# Patient Record
Sex: FEMALE | Race: WHITE | URBAN - METROPOLITAN AREA
[De-identification: names, ages, dates, MRNs, and addresses within clinical notes are randomized per-mention and may not be internally consistent; named-entity substitution may affect disease eponyms.]

---

## 2020-12-14 ENCOUNTER — OFFICE VISIT (OUTPATIENT)
Dept: URBAN - METROPOLITAN AREA CLINIC 115 | Facility: CLINIC | Age: 50
End: 2020-12-14
Payer: COMMERCIAL

## 2020-12-14 ENCOUNTER — WEB ENCOUNTER (OUTPATIENT)
Dept: URBAN - METROPOLITAN AREA CLINIC 115 | Facility: CLINIC | Age: 50
End: 2020-12-14

## 2020-12-14 DIAGNOSIS — K51.00 ULCERATIVE PANCOLITIS: ICD-10-CM

## 2020-12-14 PROCEDURE — G9903 PT SCRN TBCO ID AS NON USER: HCPCS | Performed by: INTERNAL MEDICINE

## 2020-12-14 PROCEDURE — G8420 CALC BMI NORM PARAMETERS: HCPCS | Performed by: INTERNAL MEDICINE

## 2020-12-14 PROCEDURE — 99213 OFFICE O/P EST LOW 20 MIN: CPT | Performed by: INTERNAL MEDICINE

## 2020-12-14 PROCEDURE — G8483 FLU IMM NO ADMIN DOC REA: HCPCS | Performed by: INTERNAL MEDICINE

## 2020-12-14 PROCEDURE — 3017F COLORECTAL CA SCREEN DOC REV: CPT | Performed by: INTERNAL MEDICINE

## 2020-12-14 PROCEDURE — G8427 DOCREV CUR MEDS BY ELIG CLIN: HCPCS | Performed by: INTERNAL MEDICINE

## 2020-12-14 RX ORDER — FLUTICASONE PROPIONATE 50 UG/1
SPRAY, METERED NASAL
Qty: 0 | Refills: 0 | Status: ACTIVE | COMMUNITY
Start: 1900-01-01

## 2020-12-14 RX ORDER — LEVONORGESTREL AND ETHINYL ESTRADIOL 0.15-0.03
TAKE 1 TABLET BY ORAL ROUTE ONCE DAILY KIT ORAL 1
Qty: 0 | Refills: 0 | Status: ACTIVE | COMMUNITY
Start: 1900-01-01

## 2020-12-14 RX ORDER — MELATONIN 5 MG
CAPSULE ORAL
Qty: 0 | Refills: 0 | Status: ACTIVE | COMMUNITY
Start: 1900-01-01

## 2020-12-14 RX ORDER — ATORVASTATIN CALCIUM 40 MG/1
TABLET, FILM COATED ORAL
Qty: 0 | Refills: 0 | Status: ACTIVE | COMMUNITY
Start: 1900-01-01

## 2020-12-14 RX ORDER — IBUPROFEN 200 MG/1
CAPSULE, LIQUID FILLED ORAL
Qty: 0 | Refills: 0 | Status: ACTIVE | COMMUNITY
Start: 1900-01-01

## 2020-12-14 RX ORDER — PSEUDOEPHEDRINE HYDROCHLORIDE 30 MG/1
TAKE 2 TABLETS (60 MG) BY ORAL ROUTE EVERY 6 HOURS AS NEEDED TABLET, COATED ORAL
Qty: 0 | Refills: 0 | Status: ACTIVE | COMMUNITY
Start: 1900-01-01

## 2020-12-14 RX ORDER — LORATADINE 5 MG/5 ML
TAKE 10 MILLILITERS (10 MG) BY ORAL ROUTE ONCE DAILY SOLUTION, ORAL ORAL 1
Qty: 0 | Refills: 0 | Status: ACTIVE | COMMUNITY
Start: 1900-01-01

## 2020-12-14 RX ORDER — BIFIDOBACTERIUM LONGUM 10MM CELL
CAPSULE ORAL
Qty: 0 | Refills: 0 | Status: ACTIVE | COMMUNITY
Start: 1900-01-01

## 2020-12-14 RX ORDER — EZETIMIBE 10 MG/1
TAKE 1 TABLET (10 MG) BY ORAL ROUTE ONCE DAILY TABLET ORAL 1
Qty: 0 | Refills: 0 | Status: ACTIVE | COMMUNITY
Start: 1900-01-01

## 2020-12-14 NOTE — HPI-TODAY'S VISIT:
Ms. Coffey returns in follow-up of ulcerative colitis she is in remission on Lialda.  She has no active complaints.  She brings labs from her primary care physician from the end of July that are all normal.  Last colonoscopy February 4, 2020, 1 single patchy area of eroded mucosa in the ascending colon.  Remainder of colon was normal.  Biopsies every 10 cm were negative for dysplasia. Ms. Coffey returns in follow-up of ulcerative colitis she is in remission on Lialda.  She has no active complaints.  She brings labs from her primary care physician from the end of July that are all normal.  Last colonoscopy February 4, 2020, 1 single patchy area of eroded mucosa in the ascending colon.  Remainder of colon was normal.  Biopsies every 10 cm were negative for dysplasia.

## 2021-01-25 ENCOUNTER — TELEPHONE ENCOUNTER (OUTPATIENT)
Dept: URBAN - METROPOLITAN AREA CLINIC 82 | Facility: CLINIC | Age: 51
End: 2021-01-25

## 2021-01-25 ENCOUNTER — ERX REFILL RESPONSE (OUTPATIENT)
Dept: URBAN - METROPOLITAN AREA CLINIC 13 | Facility: CLINIC | Age: 51
End: 2021-01-25

## 2021-01-25 RX ORDER — MESALAMINE 1.2 G/1
TAKE 2 TABLETS BY MOUTH EVERY DAY TABLET, DELAYED RELEASE ORAL
Qty: 180 | Refills: 3

## 2022-01-10 ENCOUNTER — WEB ENCOUNTER (OUTPATIENT)
Dept: URBAN - METROPOLITAN AREA CLINIC 115 | Facility: CLINIC | Age: 52
End: 2022-01-10

## 2022-01-10 ENCOUNTER — OFFICE VISIT (OUTPATIENT)
Dept: URBAN - METROPOLITAN AREA CLINIC 115 | Facility: CLINIC | Age: 52
End: 2022-01-10
Payer: COMMERCIAL

## 2022-01-10 DIAGNOSIS — K51.00 ULCERATIVE PANCOLITIS: ICD-10-CM

## 2022-01-10 PROCEDURE — 99213 OFFICE O/P EST LOW 20 MIN: CPT | Performed by: INTERNAL MEDICINE

## 2022-01-10 RX ORDER — ATORVASTATIN CALCIUM 40 MG/1
TABLET, FILM COATED ORAL
Qty: 0 | Refills: 0 | Status: ACTIVE | COMMUNITY
Start: 1900-01-01

## 2022-01-10 RX ORDER — LORATADINE 5 MG/5 ML
TAKE 10 MILLILITERS (10 MG) BY ORAL ROUTE ONCE DAILY SOLUTION, ORAL ORAL 1
Qty: 0 | Refills: 0 | Status: ACTIVE | COMMUNITY
Start: 1900-01-01

## 2022-01-10 RX ORDER — PSEUDOEPHEDRINE HYDROCHLORIDE 30 MG/1
TAKE 2 TABLETS (60 MG) BY ORAL ROUTE EVERY 6 HOURS AS NEEDED TABLET, COATED ORAL
Qty: 0 | Refills: 0 | Status: ACTIVE | COMMUNITY
Start: 1900-01-01

## 2022-01-10 RX ORDER — BIFIDOBACTERIUM LONGUM 10MM CELL
CAPSULE ORAL
Qty: 0 | Refills: 0 | Status: ACTIVE | COMMUNITY
Start: 1900-01-01

## 2022-01-10 RX ORDER — MESALAMINE 1.2 G/1
TAKE 2 TABLETS BY MOUTH EVERY DAY TABLET, DELAYED RELEASE ORAL
Qty: 180 | Refills: 3 | Status: ACTIVE | COMMUNITY

## 2022-01-10 RX ORDER — LEVONORGESTREL AND ETHINYL ESTRADIOL 0.15-0.03
1 TABLET KIT ORAL ONCE A DAY
Status: ACTIVE | COMMUNITY

## 2022-01-10 RX ORDER — LEVONORGESTREL AND ETHINYL ESTRADIOL 0.15-0.03
TAKE 1 TABLET BY ORAL ROUTE ONCE DAILY KIT ORAL 1
Qty: 0 | Refills: 0 | COMMUNITY
Start: 1900-01-01

## 2022-01-10 RX ORDER — MESALAMINE 1.2 G/1
2 TABLETS WITH A MEAL TABLET, DELAYED RELEASE ORAL ONCE A DAY
Qty: 180 TABLET | Refills: 3 | OUTPATIENT
Start: 2022-01-10 | End: 2023-01-05

## 2022-01-10 RX ORDER — FLUTICASONE PROPIONATE 50 UG/1
SPRAY, METERED NASAL
Qty: 0 | Refills: 0 | Status: ACTIVE | COMMUNITY
Start: 1900-01-01

## 2022-01-10 RX ORDER — MELATONIN 5 MG
CAPSULE ORAL
Qty: 0 | Refills: 0 | Status: ACTIVE | COMMUNITY
Start: 1900-01-01

## 2022-01-10 RX ORDER — EZETIMIBE 10 MG/1
TAKE 1 TABLET (10 MG) BY ORAL ROUTE ONCE DAILY TABLET ORAL 1
Qty: 0 | Refills: 0 | Status: ACTIVE | COMMUNITY
Start: 1900-01-01

## 2022-01-10 RX ORDER — IBUPROFEN 200 MG/1
CAPSULE, LIQUID FILLED ORAL
Qty: 0 | Refills: 0 | Status: ACTIVE | COMMUNITY
Start: 1900-01-01

## 2022-01-10 NOTE — HPI-TODAY'S VISIT:
Ms. Coffey returns in follow-up of ulcerative colitis she is in remission on Lialda.  She has no active complaints.  I last saw her in office 12/2020. Last colonoscopy February 4, 2020, 1 single patchy area of eroded mucosa in the ascending colon.  Remainder of colon was normal.  Biopsies every 10 cm were negative for dysplasia.  Averages 1 flare per year.  Last was December 2020, prior to that 01/2020.  With flares symptoms are diarrhea and nausea.    Things back to normal now.  Due to change in insurance wants to hold off on her surveilance colonoscopy for 6 to 12 months.

## 2022-01-11 ENCOUNTER — TELEPHONE ENCOUNTER (OUTPATIENT)
Dept: URBAN - METROPOLITAN AREA CLINIC 82 | Facility: CLINIC | Age: 52
End: 2022-01-11

## 2022-01-11 ENCOUNTER — WEB ENCOUNTER (OUTPATIENT)
Dept: URBAN - METROPOLITAN AREA CLINIC 115 | Facility: CLINIC | Age: 52
End: 2022-01-11

## 2022-01-11 LAB
A/G RATIO: 2.5
ALBUMIN: 4.8
ALKALINE PHOSPHATASE: 42
ALT (SGPT): 18
AST (SGOT): 20
BILIRUBIN, TOTAL: 0.3
BUN/CREATININE RATIO: 22
BUN: 19
C-REACTIVE PROTEIN, QUANT: <1
CALCIUM: 10
CARBON DIOXIDE, TOTAL: 21
CHLORIDE: 100
CREATININE: 0.85
EGFR IF AFRICN AM: 92
EGFR IF NONAFRICN AM: 80
GLOBULIN, TOTAL: 1.9
GLUCOSE: 82
HEMATOCRIT: 40.3
HEMOGLOBIN: 13.5
MCH: 32.1
MCHC: 33.5
MCV: 96
NRBC: (no result)
PLATELETS: 295
POTASSIUM: 4.1
PROTEIN, TOTAL: 6.7
RBC: 4.21
RDW: 12.1
SODIUM: 138
WBC: 8.1

## 2023-01-09 ENCOUNTER — OFFICE VISIT (OUTPATIENT)
Dept: URBAN - METROPOLITAN AREA CLINIC 115 | Facility: CLINIC | Age: 53
End: 2023-01-09
Payer: COMMERCIAL

## 2023-01-09 ENCOUNTER — LAB OUTSIDE AN ENCOUNTER (OUTPATIENT)
Dept: URBAN - METROPOLITAN AREA CLINIC 115 | Facility: CLINIC | Age: 53
End: 2023-01-09

## 2023-01-09 VITALS
TEMPERATURE: 97.8 F | BODY MASS INDEX: 23 KG/M2 | HEART RATE: 60 BPM | DIASTOLIC BLOOD PRESSURE: 78 MMHG | SYSTOLIC BLOOD PRESSURE: 132 MMHG | HEIGHT: 63 IN | WEIGHT: 129.8 LBS

## 2023-01-09 DIAGNOSIS — K51.00 ULCERATIVE PANCOLITIS: ICD-10-CM

## 2023-01-09 PROCEDURE — 99213 OFFICE O/P EST LOW 20 MIN: CPT | Performed by: INTERNAL MEDICINE

## 2023-01-09 RX ORDER — MESALAMINE 1.2 G/1
TAKE 2 TABLETS BY MOUTH EVERY DAY TABLET, DELAYED RELEASE ORAL
Qty: 180 | Refills: 3 | Status: ACTIVE | COMMUNITY

## 2023-01-09 RX ORDER — OCTISALATE, AVOBENZONE, HOMOSALATE, AND OCTOCRYLENE 29.4; 29.4; 49; 25.48 MG/ML; MG/ML; MG/ML; MG/ML
AS DIRECTED LOTION TOPICAL
Status: ACTIVE | COMMUNITY

## 2023-01-09 RX ORDER — IBUPROFEN 200 MG/1
CAPSULE, LIQUID FILLED ORAL
Qty: 0 | Refills: 0 | Status: ON HOLD | COMMUNITY
Start: 1900-01-01

## 2023-01-09 RX ORDER — FLUTICASONE PROPIONATE 50 UG/1
SPRAY, METERED NASAL
Qty: 0 | Refills: 0 | Status: ACTIVE | COMMUNITY
Start: 1900-01-01

## 2023-01-09 RX ORDER — PSEUDOEPHEDRINE HYDROCHLORIDE 30 MG/1
TAKE 2 TABLETS (60 MG) BY ORAL ROUTE EVERY 6 HOURS AS NEEDED TABLET, COATED ORAL
Qty: 0 | Refills: 0 | Status: ACTIVE | COMMUNITY
Start: 1900-01-01

## 2023-01-09 RX ORDER — LEVONORGESTREL AND ETHINYL ESTRADIOL 0.15-0.03
TAKE 1 TABLET BY ORAL ROUTE ONCE DAILY KIT ORAL 1
Qty: 0 | Refills: 0 | COMMUNITY
Start: 1900-01-01

## 2023-01-09 RX ORDER — EZETIMIBE 10 MG/1
TAKE 1 TABLET (10 MG) BY ORAL ROUTE ONCE DAILY TABLET ORAL 1
Qty: 0 | Refills: 0 | Status: ON HOLD | COMMUNITY
Start: 1900-01-01

## 2023-01-09 RX ORDER — LEVONORGESTREL AND ETHINYL ESTRADIOL 0.15-0.03
1 TABLET KIT ORAL ONCE A DAY
Status: ACTIVE | COMMUNITY

## 2023-01-09 RX ORDER — MELATONIN 5 MG
CAPSULE ORAL
Qty: 0 | Refills: 0 | Status: ON HOLD | COMMUNITY
Start: 1900-01-01

## 2023-01-09 RX ORDER — BIFIDOBACTERIUM LONGUM 10MM CELL
CAPSULE ORAL
Qty: 0 | Refills: 0 | Status: ACTIVE | COMMUNITY
Start: 1900-01-01

## 2023-01-09 RX ORDER — EZETIMIBE 10 MG/1
1 TABLET TABLET ORAL ONCE A DAY
Status: ACTIVE | COMMUNITY

## 2023-01-09 RX ORDER — OXYCODONE HYDROCHLORIDE AND ACETAMINOPHEN 500 MG
1 TABLET TABLET ORAL ONCE A DAY
Status: ACTIVE | COMMUNITY

## 2023-01-09 RX ORDER — LORATADINE 10 MG/1
1 TABLET TABLET ORAL ONCE A DAY
Status: ACTIVE | COMMUNITY

## 2023-01-09 RX ORDER — FLUTICASONE PROPIONATE 50 UG/1
1 SPRAY IN EACH NOSTRIL SPRAY, METERED NASAL ONCE A DAY
Status: ACTIVE | COMMUNITY

## 2023-01-09 RX ORDER — ATORVASTATIN CALCIUM 40 MG/1
TABLET, FILM COATED ORAL
Qty: 0 | Refills: 0 | Status: ACTIVE | COMMUNITY
Start: 1900-01-01

## 2023-01-09 RX ORDER — LORATADINE 5 MG/5 ML
TAKE 10 MILLILITERS (10 MG) BY ORAL ROUTE ONCE DAILY SOLUTION, ORAL ORAL 1
Qty: 0 | Refills: 0 | Status: ACTIVE | COMMUNITY
Start: 1900-01-01

## 2023-01-09 NOTE — HPI-TODAY'S VISIT:
51 y/o white female presents for follow up of ulcerative colitis. In remission on Lialda.  She has no active complaints.   Last surveillance colonoscopy February 4, 2020, 1 single patchy area of eroded mucosa in the ascending colon.  Remainder of colon was normal.  Biopsies every 10 cm were negative for dysplasia.  Overdue for surveillance however she defered last year due to insurance changes.  Last labs by PCP 8/10/22.  Normal CBC and CMP.  Had alot of stress after father's death last year, did not result in a flare.

## 2023-01-10 ENCOUNTER — TELEPHONE ENCOUNTER (OUTPATIENT)
Dept: URBAN - METROPOLITAN AREA CLINIC 115 | Facility: CLINIC | Age: 53
End: 2023-01-10

## 2023-01-10 ENCOUNTER — TELEPHONE ENCOUNTER (OUTPATIENT)
Dept: URBAN - METROPOLITAN AREA CLINIC 82 | Facility: CLINIC | Age: 53
End: 2023-01-10

## 2023-01-10 LAB
A/G RATIO: 2.4
ALBUMIN: 4.6
ALKALINE PHOSPHATASE: 45
ALT (SGPT): 30
AST (SGOT): 32
BILIRUBIN, TOTAL: 0.5
BUN/CREATININE RATIO: (no result)
BUN: 22
C-REACTIVE PROTEIN, QUANT: <0.2
CALCIUM: 10
CARBON DIOXIDE, TOTAL: 28
CHLORIDE: 105
CREATININE: 0.74
EGFR: 97
GLOBULIN, TOTAL: 1.9
GLUCOSE: 63
HEMATOCRIT: 40
HEMOGLOBIN: 13.2
MCH: 32.2
MCHC: 33
MCV: 97.6
MPV: 10.1
PLATELET COUNT: 205
POTASSIUM: 5.1
PROTEIN, TOTAL: 6.5
RDW: 12
RED BLOOD CELL COUNT: 4.1
SODIUM: 141
WHITE BLOOD CELL COUNT: 6.3

## 2023-01-13 ENCOUNTER — ERX REFILL RESPONSE (OUTPATIENT)
Dept: URBAN - METROPOLITAN AREA CLINIC 115 | Facility: CLINIC | Age: 53
End: 2023-01-13

## 2023-01-13 RX ORDER — MESALAMINE 1.2 G/1
2 TABLETS WITH A MEAL ORALLY ONCE A DAY 90 DAYS TABLET, DELAYED RELEASE ORAL
Qty: 180 TABLET | Refills: 3 | OUTPATIENT

## 2023-01-13 RX ORDER — MESALAMINE 1.2 G/1
TAKE 2 TABLETS BY MOUTH EVERY DAY TABLET, DELAYED RELEASE ORAL
Qty: 180 | Refills: 3 | OUTPATIENT

## 2023-01-18 PROBLEM — 444548001: Status: ACTIVE | Noted: 2020-12-15

## 2023-02-14 ENCOUNTER — OFFICE VISIT (OUTPATIENT)
Dept: URBAN - METROPOLITAN AREA SURGERY CENTER 13 | Facility: SURGERY CENTER | Age: 53
End: 2023-02-14
Payer: COMMERCIAL

## 2023-02-14 ENCOUNTER — CLAIMS CREATED FROM THE CLAIM WINDOW (OUTPATIENT)
Dept: URBAN - METROPOLITAN AREA CLINIC 4 | Facility: CLINIC | Age: 53
End: 2023-02-14
Payer: COMMERCIAL

## 2023-02-14 DIAGNOSIS — K63.89 APPENDICITIS EPIPLOICA: ICD-10-CM

## 2023-02-14 DIAGNOSIS — K51.00 ACUTE ULCERATIVE PANCOLITIS: ICD-10-CM

## 2023-02-14 DIAGNOSIS — K63.89 OTHER SPECIFIED DISEASES OF INTESTINE: ICD-10-CM

## 2023-02-14 DIAGNOSIS — K52.89 (LYMPHOCYTIC) MICROSCOPIC COLITIS: ICD-10-CM

## 2023-02-14 PROCEDURE — 88305 TISSUE EXAM BY PATHOLOGIST: CPT | Performed by: PATHOLOGY

## 2023-02-14 PROCEDURE — G8907 PT DOC NO EVENTS ON DISCHARG: HCPCS | Performed by: INTERNAL MEDICINE

## 2023-02-14 PROCEDURE — 45380 COLONOSCOPY AND BIOPSY: CPT | Performed by: INTERNAL MEDICINE

## 2023-02-21 ENCOUNTER — WEB ENCOUNTER (OUTPATIENT)
Dept: URBAN - METROPOLITAN AREA CLINIC 115 | Facility: CLINIC | Age: 53
End: 2023-02-21

## 2024-01-22 ENCOUNTER — OFFICE VISIT (OUTPATIENT)
Dept: URBAN - METROPOLITAN AREA CLINIC 115 | Facility: CLINIC | Age: 54
End: 2024-01-22
Payer: COMMERCIAL

## 2024-01-22 ENCOUNTER — DASHBOARD ENCOUNTERS (OUTPATIENT)
Age: 54
End: 2024-01-22

## 2024-01-22 VITALS
BODY MASS INDEX: 25.16 KG/M2 | DIASTOLIC BLOOD PRESSURE: 81 MMHG | WEIGHT: 142 LBS | SYSTOLIC BLOOD PRESSURE: 120 MMHG | HEART RATE: 63 BPM | TEMPERATURE: 98.4 F | HEIGHT: 63 IN

## 2024-01-22 DIAGNOSIS — K51.00 ULCERATIVE PANCOLITIS: ICD-10-CM

## 2024-01-22 PROCEDURE — 99213 OFFICE O/P EST LOW 20 MIN: CPT | Performed by: INTERNAL MEDICINE

## 2024-01-22 RX ORDER — FLUTICASONE PROPIONATE 50 UG/1
SPRAY, METERED NASAL
Qty: 0 | Refills: 0 | Status: ACTIVE | COMMUNITY
Start: 1900-01-01

## 2024-01-22 RX ORDER — LEVONORGESTREL AND ETHINYL ESTRADIOL 0.15-0.03
1 TABLET KIT ORAL ONCE A DAY
Status: ACTIVE | COMMUNITY

## 2024-01-22 RX ORDER — MAGNESIUM GLYCINATE 100 MG
AS DIRECTED TABLET ORAL
Status: ACTIVE | COMMUNITY

## 2024-01-22 RX ORDER — BIFIDOBACTERIUM LONGUM 10MM CELL
CAPSULE ORAL
Qty: 0 | Refills: 0 | Status: ACTIVE | COMMUNITY
Start: 1900-01-01

## 2024-01-22 RX ORDER — IBUPROFEN 200 MG/1
CAPSULE, LIQUID FILLED ORAL
Qty: 0 | Refills: 0 | Status: ON HOLD | COMMUNITY
Start: 1900-01-01

## 2024-01-22 RX ORDER — OXYCODONE HYDROCHLORIDE AND ACETAMINOPHEN 500 MG
1 TABLET TABLET ORAL ONCE A DAY
Status: ACTIVE | COMMUNITY

## 2024-01-22 RX ORDER — LORATADINE 5 MG/5 ML
TAKE 10 MILLILITERS (10 MG) BY ORAL ROUTE ONCE DAILY SOLUTION, ORAL ORAL 1
Qty: 0 | Refills: 0 | Status: ACTIVE | COMMUNITY
Start: 1900-01-01

## 2024-01-22 RX ORDER — ATORVASTATIN CALCIUM 20 MG/1
1 TABLET TABLET, FILM COATED ORAL ONCE A DAY
Status: ACTIVE | COMMUNITY

## 2024-01-22 RX ORDER — EZETIMIBE 10 MG/1
1 TABLET TABLET ORAL ONCE A DAY
Status: ACTIVE | COMMUNITY

## 2024-01-22 RX ORDER — LORATADINE 10 MG/1
1 TABLET TABLET ORAL ONCE A DAY
Status: ACTIVE | COMMUNITY

## 2024-01-22 RX ORDER — EZETIMIBE 10 MG/1
TAKE 1 TABLET (10 MG) BY ORAL ROUTE ONCE DAILY TABLET ORAL 1
Qty: 0 | Refills: 0 | Status: ON HOLD | COMMUNITY
Start: 1900-01-01

## 2024-01-22 RX ORDER — ATORVASTATIN CALCIUM 40 MG/1
TABLET, FILM COATED ORAL
Qty: 0 | Refills: 0 | Status: DISCONTINUED | COMMUNITY
Start: 1900-01-01

## 2024-01-22 RX ORDER — MESALAMINE 1.2 G/1
2 TABLETS WITH A MEAL ORALLY ONCE A DAY 90 DAYS TABLET, DELAYED RELEASE ORAL
Qty: 180 TABLET | Refills: 3 | Status: ACTIVE | COMMUNITY

## 2024-01-22 RX ORDER — MELATONIN 5 MG
CAPSULE ORAL
Qty: 0 | Refills: 0 | Status: ON HOLD | COMMUNITY
Start: 1900-01-01

## 2024-01-22 RX ORDER — MESALAMINE 1.2 G/1
2 TABLETS WITH A MEAL ORALLY ONCE A DAY 90 DAYS TABLET, DELAYED RELEASE ORAL ONCE A DAY
Qty: 180 | Refills: 3

## 2024-01-22 RX ORDER — LEVONORGESTREL AND ETHINYL ESTRADIOL 0.15-0.03
TAKE 1 TABLET BY ORAL ROUTE ONCE DAILY KIT ORAL 1
Qty: 0 | Refills: 0 | COMMUNITY
Start: 1900-01-01

## 2024-01-22 RX ORDER — PSEUDOEPHEDRINE HYDROCHLORIDE 30 MG/1
TAKE 2 TABLETS (60 MG) BY ORAL ROUTE EVERY 6 HOURS AS NEEDED TABLET, COATED ORAL
Qty: 0 | Refills: 0 | Status: ACTIVE | COMMUNITY
Start: 1900-01-01

## 2024-01-22 RX ORDER — OCTISALATE, AVOBENZONE, HOMOSALATE, AND OCTOCRYLENE 29.4; 29.4; 49; 25.48 MG/ML; MG/ML; MG/ML; MG/ML
AS DIRECTED LOTION TOPICAL
Status: ACTIVE | COMMUNITY

## 2024-01-22 RX ORDER — FLUTICASONE PROPIONATE 50 UG/1
1 SPRAY IN EACH NOSTRIL SPRAY, METERED NASAL ONCE A DAY
Status: ACTIVE | COMMUNITY

## 2024-01-22 NOTE — HPI-TODAY'S VISIT:
54 y/o white female presents for follow up of ulcerative colitis. In remission on Lialda.  She has no active complaints.   Last surveillance colonoscopy February 2023, no visible active disease, bxp neg for dysplasia, one w/focal inflammation.  Today she complains of symptoms related to menopause and pelvic floor prolapse.  Will see her GYN next month.

## 2024-01-24 LAB
A/G RATIO: 2.6
ALBUMIN: 4.6
ALKALINE PHOSPHATASE: 41
ALT (SGPT): 22
AST (SGOT): 23
BILIRUBIN, TOTAL: 0.5
BUN/CREATININE RATIO: (no result)
BUN: 20
C-REACTIVE PROTEIN, QUANT: 0.6
CALCIUM: 9.8
CARBON DIOXIDE, TOTAL: 28
CHLORIDE: 103
CREATININE: 0.73
EGFR: 98
GLOBULIN, TOTAL: 1.8
GLUCOSE: 75
HEMATOCRIT: 41
HEMOGLOBIN: 13.7
MCH: 32.3
MCHC: 33.4
MCV: 96.7
MPV: 9.6
PLATELET COUNT: 231
POTASSIUM: 5
PROTEIN, TOTAL: 6.4
RDW: 12
RED BLOOD CELL COUNT: 4.24
SODIUM: 142
WHITE BLOOD CELL COUNT: 6.3

## 2024-07-22 ENCOUNTER — OFFICE VISIT (OUTPATIENT)
Dept: URBAN - METROPOLITAN AREA CLINIC 115 | Facility: CLINIC | Age: 54
End: 2024-07-22

## 2024-11-06 ENCOUNTER — OFFICE VISIT (OUTPATIENT)
Dept: URBAN - METROPOLITAN AREA CLINIC 82 | Facility: CLINIC | Age: 54
End: 2024-11-06
Payer: COMMERCIAL

## 2024-11-06 VITALS
WEIGHT: 143.8 LBS | BODY MASS INDEX: 25.48 KG/M2 | TEMPERATURE: 97.7 F | SYSTOLIC BLOOD PRESSURE: 131 MMHG | DIASTOLIC BLOOD PRESSURE: 76 MMHG | HEIGHT: 63 IN | HEART RATE: 72 BPM

## 2024-11-06 DIAGNOSIS — K64.4 EXTERNAL HEMORRHOID: ICD-10-CM

## 2024-11-06 DIAGNOSIS — K59.4 ANORECTAL SPASM: ICD-10-CM

## 2024-11-06 PROBLEM — 23913003: Status: ACTIVE | Noted: 2024-11-06

## 2024-11-06 PROCEDURE — 99214 OFFICE O/P EST MOD 30 MIN: CPT | Performed by: INTERNAL MEDICINE

## 2024-11-06 RX ORDER — FLUTICASONE PROPIONATE 50 UG/1
1 SPRAY IN EACH NOSTRIL SPRAY, METERED NASAL ONCE A DAY
Status: ACTIVE | COMMUNITY

## 2024-11-06 RX ORDER — LORATADINE 5 MG/5 ML
TAKE 10 MILLILITERS (10 MG) BY ORAL ROUTE ONCE DAILY SOLUTION, ORAL ORAL 1
Qty: 0 | Refills: 0 | Status: ACTIVE | COMMUNITY
Start: 1900-01-01

## 2024-11-06 RX ORDER — FLUTICASONE PROPIONATE 50 UG/1
SPRAY, METERED NASAL
Qty: 0 | Refills: 0 | Status: ACTIVE | COMMUNITY
Start: 1900-01-01

## 2024-11-06 RX ORDER — BIFIDOBACTERIUM LONGUM 10MM CELL
CAPSULE ORAL
Qty: 0 | Refills: 0 | Status: ACTIVE | COMMUNITY
Start: 1900-01-01

## 2024-11-06 RX ORDER — LORATADINE 10 MG/1
1 TABLET TABLET ORAL ONCE A DAY
Status: ACTIVE | COMMUNITY

## 2024-11-06 RX ORDER — ATORVASTATIN CALCIUM 20 MG/1
1 TABLET TABLET, FILM COATED ORAL ONCE A DAY
Status: DISCONTINUED | COMMUNITY

## 2024-11-06 RX ORDER — PSEUDOEPHEDRINE HYDROCHLORIDE 30 MG/1
TAKE 2 TABLETS (60 MG) BY ORAL ROUTE EVERY 6 HOURS AS NEEDED TABLET, COATED ORAL
Qty: 0 | Refills: 0 | Status: ACTIVE | COMMUNITY
Start: 1900-01-01

## 2024-11-06 RX ORDER — IBUPROFEN 200 MG/1
CAPSULE, LIQUID FILLED ORAL
Qty: 0 | Refills: 0 | Status: ON HOLD | COMMUNITY
Start: 1900-01-01

## 2024-11-06 RX ORDER — OXYCODONE HYDROCHLORIDE AND ACETAMINOPHEN 500 MG
1 TABLET TABLET ORAL ONCE A DAY
Status: ACTIVE | COMMUNITY

## 2024-11-06 RX ORDER — OCTISALATE, AVOBENZONE, HOMOSALATE, AND OCTOCRYLENE 29.4; 29.4; 49; 25.48 MG/ML; MG/ML; MG/ML; MG/ML
AS DIRECTED LOTION TOPICAL
Status: ACTIVE | COMMUNITY

## 2024-11-06 RX ORDER — MELATONIN 5 MG
CAPSULE ORAL
Qty: 0 | Refills: 0 | Status: ON HOLD | COMMUNITY
Start: 1900-01-01

## 2024-11-06 RX ORDER — LEVONORGESTREL AND ETHINYL ESTRADIOL 0.15-0.03
TAKE 1 TABLET BY ORAL ROUTE ONCE DAILY KIT ORAL 1
Qty: 0 | Refills: 0 | COMMUNITY
Start: 1900-01-01

## 2024-11-06 RX ORDER — LEVONORGESTREL AND ETHINYL ESTRADIOL 0.15-0.03
1 TABLET KIT ORAL ONCE A DAY
Status: ACTIVE | COMMUNITY

## 2024-11-06 RX ORDER — MAGNESIUM GLYCINATE 100 MG
AS DIRECTED TABLET ORAL
Status: ACTIVE | COMMUNITY

## 2024-11-06 RX ORDER — EZETIMIBE 10 MG/1
TAKE 1 TABLET (10 MG) BY ORAL ROUTE ONCE DAILY TABLET ORAL 1
Qty: 0 | Refills: 0 | Status: ON HOLD | COMMUNITY
Start: 1900-01-01

## 2024-11-06 RX ORDER — EZETIMIBE 10 MG/1
1 TABLET TABLET ORAL ONCE A DAY
Status: DISCONTINUED | COMMUNITY

## 2024-11-06 RX ORDER — MESALAMINE 1.2 G/1
2 TABLETS WITH A MEAL ORALLY ONCE A DAY 90 DAYS TABLET, DELAYED RELEASE ORAL
Qty: 180 TABLET | Refills: 3 | Status: DISCONTINUED | COMMUNITY

## 2024-11-06 NOTE — HPI-TODAY'S VISIT:
55 y/o white female with UC, in remission on Lialda, presents for severe rectal pain. Pain started 1 month ago.  Alot of anorectal spasm, also developed hemorrhoids. Using OTC hemorrhoid creams, not improving.  Feels nodule.

## 2024-11-06 NOTE — PHYSICAL EXAM GASTROINTESTINAL
Abdomen,  soft, nontender, nondistended,  normal bowel sounds  Rectum:  hemorrhoid in right anterior position, swollen but soft and mobile, tender.  Skin tag circumferentially. Moderate spasm on digital exam. No bleeding.  Anoscopy:  hemorrhoid present.  No masses.  No bleeding.

## 2024-12-11 ENCOUNTER — OFFICE VISIT (OUTPATIENT)
Dept: URBAN - METROPOLITAN AREA CLINIC 82 | Facility: CLINIC | Age: 54
End: 2024-12-11

## 2025-01-20 ENCOUNTER — OFFICE VISIT (OUTPATIENT)
Dept: URBAN - METROPOLITAN AREA CLINIC 115 | Facility: CLINIC | Age: 55
End: 2025-01-20

## 2025-02-10 ENCOUNTER — OFFICE VISIT (OUTPATIENT)
Dept: URBAN - METROPOLITAN AREA CLINIC 115 | Facility: CLINIC | Age: 55
End: 2025-02-10
Payer: COMMERCIAL

## 2025-02-10 VITALS
WEIGHT: 144.4 LBS | SYSTOLIC BLOOD PRESSURE: 123 MMHG | BODY MASS INDEX: 25.59 KG/M2 | HEART RATE: 87 BPM | HEIGHT: 63 IN | TEMPERATURE: 98 F | DIASTOLIC BLOOD PRESSURE: 82 MMHG

## 2025-02-10 DIAGNOSIS — L29.0 PERIANAL PRURITUS: ICD-10-CM

## 2025-02-10 DIAGNOSIS — K51.00 ULCERATIVE PANCOLITIS: ICD-10-CM

## 2025-02-10 PROCEDURE — 99213 OFFICE O/P EST LOW 20 MIN: CPT | Performed by: INTERNAL MEDICINE

## 2025-02-10 RX ORDER — OXYCODONE HYDROCHLORIDE AND ACETAMINOPHEN 500 MG
1 TABLET TABLET ORAL ONCE A DAY
Status: ACTIVE | COMMUNITY

## 2025-02-10 RX ORDER — OCTISALATE, AVOBENZONE, HOMOSALATE, AND OCTOCRYLENE 29.4; 29.4; 49; 25.48 MG/ML; MG/ML; MG/ML; MG/ML
AS DIRECTED LOTION TOPICAL
Status: ACTIVE | COMMUNITY

## 2025-02-10 RX ORDER — LEVONORGESTREL AND ETHINYL ESTRADIOL 0.15-0.03
TAKE 1 TABLET BY ORAL ROUTE ONCE DAILY KIT ORAL 1
Qty: 0 | Refills: 0 | Status: ACTIVE | COMMUNITY
Start: 1900-01-01

## 2025-02-10 RX ORDER — LORATADINE 5 MG/5 ML
TAKE 10 MILLILITERS (10 MG) BY ORAL ROUTE ONCE DAILY SOLUTION, ORAL ORAL 1
Qty: 0 | Refills: 0 | Status: ACTIVE | COMMUNITY
Start: 1900-01-01

## 2025-02-10 RX ORDER — FLUTICASONE PROPIONATE 50 UG/1
SPRAY, METERED NASAL
Qty: 0 | Refills: 0 | Status: ACTIVE | COMMUNITY
Start: 1900-01-01

## 2025-02-10 RX ORDER — NAPROXEN SODIUM 220 MG
CAPSULE ORAL
Qty: 0 | Refills: 0 | Status: ACTIVE | COMMUNITY
Start: 1900-01-01

## 2025-02-10 RX ORDER — BIFIDOBACTERIUM LONGUM 10MM CELL
CAPSULE ORAL
Qty: 0 | Refills: 0 | Status: ACTIVE | COMMUNITY
Start: 1900-01-01

## 2025-02-10 RX ORDER — FLUTICASONE PROPIONATE 50 UG/1
1 SPRAY IN EACH NOSTRIL SPRAY, METERED NASAL ONCE A DAY
Status: ACTIVE | COMMUNITY

## 2025-02-10 RX ORDER — MAGNESIUM GLYCINATE 100 MG
AS DIRECTED TABLET ORAL
Status: ACTIVE | COMMUNITY

## 2025-02-10 RX ORDER — CLOTRIMAZOLE 1 G/100G
1 APPLICATION CREAM TOPICAL TWICE A DAY
Qty: 30 GRAMS | Refills: 0 | OUTPATIENT
Start: 2025-02-10 | End: 2025-02-24

## 2025-02-10 RX ORDER — MESALAMINE 1.2 G/1
2 TABLETS WITH A MEAL ORALLY ONCE A DAY 90 DAYS TABLET, DELAYED RELEASE ORAL ONCE A DAY
Qty: 180 | Refills: 3

## 2025-02-10 RX ORDER — PSEUDOEPHEDRINE HYDROCHLORIDE 30 MG/1
TAKE 2 TABLETS (60 MG) BY ORAL ROUTE EVERY 6 HOURS AS NEEDED TABLET, COATED ORAL
Qty: 0 | Refills: 0 | Status: ACTIVE | COMMUNITY
Start: 1900-01-01

## 2025-02-10 NOTE — HPI-TODAY'S VISIT:
53 y/o white female with UC presents in follow up. Last seen 12/2024 for rectal pain.  Had anorectal spasm and hemorrhoid. Spasm and pain resolved.  Now having perianal itching, severe at times.   injured during ski trip, resulted in need to travel home by train. Trip prolonged so out ot [place].  No flare.

## 2025-02-10 NOTE — PHYSICAL EXAM GASTROINTESTINAL
Abdomen,  soft, nontender, nondistended,  normal bowel sounds  Rectum: Skin tag circumferentially.  No thrombosed or swollen hemorrhoids.  Perianal erythema w/satellite lesions.

## 2025-02-11 LAB
A/G RATIO: 2.3
ABSOLUTE BASOPHILS: 61
ABSOLUTE EOSINOPHILS: 41
ABSOLUTE LYMPHOCYTES: 2842
ABSOLUTE MONOCYTES: 401
ABSOLUTE NEUTROPHILS: 3454
ALBUMIN: 4.5
ALKALINE PHOSPHATASE: 46
ALT (SGPT): 18
AST (SGOT): 22
BASOPHILS: 0.9
BILIRUBIN, TOTAL: 0.6
BUN/CREATININE RATIO: (no result)
BUN: 17
CALCIUM: 9.7
CARBON DIOXIDE, TOTAL: 28
CHLORIDE: 104
CREATININE: 0.72
EGFR: 99
EOSINOPHILS: 0.6
GLOBULIN, TOTAL: 2
GLUCOSE: 83
HEMATOCRIT: 39.6
HEMOGLOBIN: 13.4
LYMPHOCYTES: 41.8
MCH: 31.9
MCHC: 33.8
MCV: 94.3
MONOCYTES: 5.9
MPV: 9.7
NEUTROPHILS: 50.8
PLATELET COUNT: 246
POTASSIUM: 5.1
PROTEIN, TOTAL: 6.5
RDW: 12.5
RED BLOOD CELL COUNT: 4.2
SODIUM: 140
WHITE BLOOD CELL COUNT: 6.8

## 2025-05-19 NOTE — PHYSICAL EXAM HENT:
Head,  normocephalic,  atraumatic,  Face,  Face within normal limits,  Ears,  External ears within normal limits, Wearing facemask No